# Patient Record
(demographics unavailable — no encounter records)

---

## 2025-02-26 NOTE — DATA REVIEWED
[de-identified] : overdue [de-identified] : FVC-122%, FEV1-76%, [id number]%    Pulmonary function testing done as part of standard of care for cystic fibrosis to assess lung disease

## 2025-02-26 NOTE — PHYSICAL EXAM
[Well Nourished] : well nourished [Well Developed] : well developed [Well Groomed] : well groomed [Alert] : ~L alert [Active] : active [Normal Breathing Pattern] : normal breathing pattern [No Respiratory Distress] : no respiratory distress [No Allergic Shiners] : no allergic shiners [No Drainage] : no drainage [No Conjunctivitis] : no conjunctivitis [Tympanic Membranes Clear] : tympanic membranes were clear [Nasal Mucosa Non-Edematous] : nasal mucosa non-edematous [No Nasal Drainage] : no nasal drainage [No Polyps] : no polyps [No Sinus Tenderness] : no sinus tenderness [No Oral Pallor] : no oral pallor [No Oral Cyanosis] : no oral cyanosis [Non-Erythematous] : non-erythematous [No Exudates] : no exudates [No Postnasal Drip] : no postnasal drip [No Tonsillar Enlargement] : no tonsillar enlargement [Absence Of Retractions] : absence of retractions [Symmetric] : symmetric [Good Expansion] : good expansion [No Acc Muscle Use] : no accessory muscle use [Good aeration to bases] : good aeration to bases [Equal Breath Sounds] : equal breath sounds bilaterally [No Crackles] : no crackles [No Rhonchi] : no rhonchi [No Wheezing] : no wheezing [Normal Sinus Rhythm] : normal sinus rhythm [No Heart Murmur] : no heart murmur [Soft, Non-Tender] : soft, non-tender [No Hepatosplenomegaly] : no hepatosplenomegaly [Non Distended] : was not ~L distended [Abdomen Mass (___ Cm)] : no abdominal mass palpated [Full ROM] : full range of motion [No Clubbing] : no clubbing [Capillary Refill < 2 secs] : capillary refill less than two seconds [No Cyanosis] : no cyanosis [No Petechiae] : no petechiae [No Edema] : no edema [No Kyphoscoliosis] : no kyphoscoliosis [No Contractures] : no contractures [Alert and  Oriented] : alert and oriented [No Abnormal Focal Findings] : no abnormal focal findings [Normal Muscle Tone And Reflexes] : normal muscle tone and reflexes [No Birth Marks] : no birth marks [No Rashes] : no rashes [No Skin Lesions] : no skin lesions [FreeTextEntry1] : wearing mask, no cough, no resp distress

## 2025-02-26 NOTE — DISCUSSION/SUMMARY
[FreeTextEntry1] : 22 y/o male with CF known via amniocentesis, [id number], pancreatic insufficiency well controlled with enzymes, colonized with MSSA, h/o (+) pseudomonas in Feb 2017 and negative since that time. Is HEMT eligible and receives Trikafta. Issues regarding adherence and how best to address this was discussed at length with Thomas and his Dad. We reviewed Alyftrek availability, but Thomas is unsure if he can make the commitment to monthly blood testing.   No acute health concerns today, issues with asthma ICS compliance, PFTs demonstrate severe obstruction in small airways today. reviewed medication compliance and ICS use at length with patient today.  BMI 28.12 for an adult male is above the 50th %. PERT Dose 1,700 u/lipase/kg/meal. No steatorrhea reported. Continues on fat soluble vitamins. LFTS for Trikafta overdue, CXR and annual surveillance labs overdue.  Social work discussion today around transition to adult care  plan  will refill Trikafta once labs have been obtained Dad explained that they would go to the Lab today CXR / DEXA - Dad stated they would get the CXR on Friday Annual Labs/ OGTT  Sputum culture obtained today  Asthma- QVAR BID  Transition to adult CF provider  NO concerns or questions. Social work reviewed transition readiness as well as completed mental health forms. Since Thomas has not been seen here since April 2024, there is concern that he may delay making an appt with the adult team. We will continue to refill meds as much as reasonable.

## 2025-02-26 NOTE — HISTORY OF PRESENT ILLNESS
[Father] : father [Patient] : patient [Parents] : parents [FreeTextEntry1] : 02/26/2025 last seen on 4/11/2024  20 y/o male with CF here for routine CF quarterly visit Started Trikafta in Jan 2020.  Interval: working on adherence with medications, some intermittent viruses    Pulm: intermittently coughing  Prescribed albuterol / Pulmozyme/ QVAR with monarch vest. doing when sick  Asthma- QVAR- ordered    GI: Creon 24,000 6 with meals and 3 with snacks (mainly have meals) MVW chewable multivitamin-2 per day  Stools 2x per day, [place] 3. Denies constipation.  Social: Living with mom, dad and siblings. Also has dog. Thomas Graduated HS in January 2021 with his GED. Works at [employer] once a week

## 2025-02-26 NOTE — DISCUSSION/SUMMARY
[FreeTextEntry1] : 20 y/o male with CF known via amniocentesis, [id number], pancreatic insufficiency well controlled with enzymes, colonized with MSSA, h/o (+) pseudomonas in Feb 2017 and negative since that time. Is HEMT eligible and receives Trikafta. Issues regarding adherence and how best to address this was discussed at length with Thomas and his Dad. We reviewed Alyftrek availability, but Thomas is unsure if he can make the commitment to monthly blood testing.   No acute health concerns today, issues with asthma ICS compliance, PFTs demonstrate severe obstruction in small airways today. reviewed medication compliance and ICS use at length with patient today.  BMI 28.12 for an adult male is above the 50th %. PERT Dose 1,700 u/lipase/kg/meal. No steatorrhea reported. Continues on fat soluble vitamins. LFTS for Trikafta overdue, CXR and annual surveillance labs overdue.  Social work discussion today around transition to adult care  plan  will refill Trikafta once labs have been obtained Dad explained that they would go to the Lab today CXR / DEXA - Dad stated they would get the CXR on Friday Annual Labs/ OGTT  Sputum culture obtained today  Asthma- QVAR BID  Transition to adult CF provider  NO concerns or questions. Social work reviewed transition readiness as well as completed mental health forms. Since Thomas has not been seen here since April 2024, there is concern that he may delay making an appt with the adult team. We will continue to refill meds as much as reasonable.

## 2025-02-26 NOTE — DATA REVIEWED
[de-identified] : overdue [de-identified] : FVC-122%, FEV1-76%, [id number]%    Pulmonary function testing done as part of standard of care for cystic fibrosis to assess lung disease

## 2025-02-26 NOTE — HISTORY OF PRESENT ILLNESS
[Father] : father [Patient] : patient [Parents] : parents [FreeTextEntry1] : 02/26/2025 last seen on 4/11/2024  22 y/o male with CF here for routine CF quarterly visit Started Trikafta in Jan 2020.  Interval: working on adherence with medications, some intermittent viruses    Pulm: intermittently coughing  Prescribed albuterol / Pulmozyme/ QVAR with monarch vest. doing when sick  Asthma- QVAR- ordered    GI: Creon 24,000 6 with meals and 3 with snacks (mainly have meals) MVW chewable multivitamin-2 per day  Stools 2x per day, [place] 3. Denies constipation.  Social: Living with mom, dad and siblings. Also has dog. Thomas Graduated HS in January 2021 with his GED. Works at [employer] once a week Complex Repair And Graft Additional Text (Will Appearing After The Standard Complex Repair Text): The complex repair was not sufficient to completely close the primary defect. The remaining additional defect was repaired with the graft mentioned below.

## 2025-02-26 NOTE — CARE PLAN
[Today's FEV: ___%] : Today's FEV: [unfilled]% [Pulmozyme] : pulmozyme [Nebulizer/equipment reviewed] : nebulizer/equipment reviewed [Vest Percussion] : vest percussion [Times per day: ____] : My goal is to do airway clearance: [unfilled] times per day [4 Quarterly visits with your CF care team] : - 4 quarterly visits with your CF care team [Yearly CXR] : - Yearly CXR [Quarterly PFTs] : - Quarterly PFTs [Diabetes Screening: ___] : - Diabetes screening: [unfilled] [Enzymes: ___] : - Enzymes: [unfilled] [Vitamins: ___] : - Vitamins: [unfilled] [Date: ___] : Date: [unfilled] [FreeTextEntry6] : QVAR 2 puffs BID (take off lid and put back on after 1 puff) [FreeTextEntry8] : Labs due today -[address] [facility] [address], and cxr due

## 2025-02-26 NOTE — CARE PLAN
[Today's FEV: ___%] : Today's FEV: [unfilled]% [Pulmozyme] : pulmozyme [Nebulizer/equipment reviewed] : nebulizer/equipment reviewed [Vest Percussion] : vest percussion [Times per day: ____] : My goal is to do airway clearance: [unfilled] times per day [4 Quarterly visits with your CF care team] : - 4 quarterly visits with your CF care team [Yearly CXR] : - Yearly CXR [Quarterly PFTs] : - Quarterly PFTs [Diabetes Screening: ___] : - Diabetes screening: [unfilled] [Enzymes: ___] : - Enzymes: [unfilled] [Vitamins: ___] : - Vitamins: [unfilled] [Date: ___] : Date: [unfilled] [FreeTextEntry6] : QVAR 2 puffs BID (take off lid and put back on after 1 puff) [FreeTextEntry8] : Labs due today -[address] [facility] [address], and cxr due